# Patient Record
Sex: FEMALE | Race: WHITE | NOT HISPANIC OR LATINO
[De-identification: names, ages, dates, MRNs, and addresses within clinical notes are randomized per-mention and may not be internally consistent; named-entity substitution may affect disease eponyms.]

---

## 2020-02-13 ENCOUNTER — TRANSCRIPTION ENCOUNTER (OUTPATIENT)
Age: 25
End: 2020-02-13

## 2020-05-01 ENCOUNTER — OUTPATIENT (OUTPATIENT)
Dept: OUTPATIENT SERVICES | Facility: HOSPITAL | Age: 25
LOS: 1 days | End: 2020-05-01
Payer: MEDICAID

## 2020-05-01 PROCEDURE — G9001: CPT

## 2020-05-08 DIAGNOSIS — Z71.89 OTHER SPECIFIED COUNSELING: ICD-10-CM

## 2020-09-29 ENCOUNTER — RESULT REVIEW (OUTPATIENT)
Age: 25
End: 2020-09-29

## 2020-09-29 ENCOUNTER — OUTPATIENT (OUTPATIENT)
Dept: OUTPATIENT SERVICES | Facility: HOSPITAL | Age: 25
LOS: 1 days | End: 2020-09-29
Payer: MEDICAID

## 2020-09-29 ENCOUNTER — TRANSCRIPTION ENCOUNTER (OUTPATIENT)
Age: 25
End: 2020-09-29

## 2020-09-29 ENCOUNTER — APPOINTMENT (OUTPATIENT)
Dept: ULTRASOUND IMAGING | Facility: CLINIC | Age: 25
End: 2020-09-29
Payer: MEDICAID

## 2020-09-29 DIAGNOSIS — Z00.8 ENCOUNTER FOR OTHER GENERAL EXAMINATION: ICD-10-CM

## 2020-09-29 PROBLEM — Z00.00 ENCOUNTER FOR PREVENTIVE HEALTH EXAMINATION: Status: ACTIVE | Noted: 2020-09-29

## 2020-09-29 PROCEDURE — 93971 EXTREMITY STUDY: CPT | Mod: 26,LT

## 2020-09-29 PROCEDURE — 93971 EXTREMITY STUDY: CPT

## 2020-10-05 ENCOUNTER — APPOINTMENT (OUTPATIENT)
Dept: VASCULAR SURGERY | Facility: CLINIC | Age: 25
End: 2020-10-05
Payer: MEDICAID

## 2020-10-05 VITALS
HEIGHT: 62 IN | TEMPERATURE: 98.1 F | HEART RATE: 79 BPM | OXYGEN SATURATION: 99 % | DIASTOLIC BLOOD PRESSURE: 68 MMHG | BODY MASS INDEX: 20.43 KG/M2 | WEIGHT: 111 LBS | SYSTOLIC BLOOD PRESSURE: 106 MMHG

## 2020-10-05 PROCEDURE — 93970 EXTREMITY STUDY: CPT

## 2020-10-05 PROCEDURE — 99203 OFFICE O/P NEW LOW 30 MIN: CPT

## 2020-10-05 NOTE — PHYSICAL EXAM
[Normal Thyroid] : the thyroid was normal [Normal Breath Sounds] : Normal breath sounds [Normal Heart Sounds] : normal heart sounds [2+] : left 2+ [Ankle Swelling (On Exam)] : present [Varicose Veins Of Lower Extremities] : present [Varicose Veins Of The Left Leg] : of the left leg [Ankle Swelling On The Left] : moderate [No Rash or Lesion] : No rash or lesion [Alert] : alert [Oriented to Person] : oriented to person [Oriented to Place] : oriented to place [Oriented to Time] : oriented to time [Calm] : calm [JVD] : no jugular venous distention  [Abdomen Masses] : No abdominal masses [Abdomen Tenderness] : ~T ~M No abdominal tenderness [de-identified] : Well appearing [de-identified] : NCAT, EOMI [FreeTextEntry1] : Scattered spider veins bilaterally

## 2020-10-05 NOTE — ASSESSMENT
[FreeTextEntry1] : 25 year old female with left lower extremity symptomatic varicose veins\par Venous duplex reveals pathologic reflux in left GSV > 5 seconds.\par She has failed conservative management strategies including compression stockings and exercise.\par I have discussed the option of GSV RF ablation. We have discussed the risks and benefits of the planned procedure and she will like to proceed.\par Will schedule for left GSV ablation

## 2020-10-05 NOTE — HISTORY OF PRESENT ILLNESS
[FreeTextEntry1] : 25 year old female presents for evaluation of left leg pain and swelling. Symptoms have worsened over the few weeks She complains of burning pain on the medial side of her left thigh and calf with standing or sitting, heaviness in her legs and itchiness of the veins around her ankle. She also has ankle swelling that worsens through the day. Symptoms are worse in her left leg.\par Pain was so severe that she went to an Urgent Care facility last week. A venous duplex performed showed no DVT.\par She regularly wears compression stockings without improvement in symptoms.\par Patient also reports a painful varicosity in her vulvar area\par No history of DVT.\par She denies claudication, rest pain or leg ulcers.\par \par

## 2020-11-09 ENCOUNTER — APPOINTMENT (OUTPATIENT)
Dept: VASCULAR SURGERY | Facility: CLINIC | Age: 25
End: 2020-11-09

## 2020-11-12 ENCOUNTER — APPOINTMENT (OUTPATIENT)
Dept: VASCULAR SURGERY | Facility: CLINIC | Age: 25
End: 2020-11-12
Payer: MEDICAID

## 2020-11-12 VITALS
BODY MASS INDEX: 20.8 KG/M2 | OXYGEN SATURATION: 97 % | RESPIRATION RATE: 16 BRPM | DIASTOLIC BLOOD PRESSURE: 75 MMHG | SYSTOLIC BLOOD PRESSURE: 113 MMHG | HEIGHT: 62 IN | TEMPERATURE: 97.7 F | HEART RATE: 104 BPM | WEIGHT: 113 LBS

## 2020-11-12 PROCEDURE — 99213 OFFICE O/P EST LOW 20 MIN: CPT

## 2020-11-12 PROCEDURE — 93971 EXTREMITY STUDY: CPT

## 2020-11-12 PROCEDURE — 99072 ADDL SUPL MATRL&STAF TM PHE: CPT

## 2020-11-12 NOTE — ASSESSMENT
[FreeTextEntry1] : 25 year old female with left lower extremity symptomatic varicose veins\par Venous duplex reveals pathologic reflux in left GSV > 5 seconds.\par She is scheduled for left GSV ablation. \par Now returning with right thigh thrombophlebitis. Venous duplex today shows no DVT or great saphenous vein reflux. She has multiple right thigh tributaries\par -Recommend warm compresses and OTC NSAIDs for relief\par -She will return for her scheduled left GSV ablation

## 2020-11-12 NOTE — HISTORY OF PRESENT ILLNESS
[FreeTextEntry1] : 25 year old female presents for evaluation of left leg pain and swelling. Symptoms have worsened over the few weeks She complains of burning pain on the medial side of her left thigh and calf with standing or sitting, heaviness in her legs and itchiness of the veins around her ankle. She also has ankle swelling that worsens through the day. Symptoms are worse in her left leg.\par Pain was so severe that she went to an Urgent Care facility last week. A venous duplex performed showed no DVT.\par She regularly wears compression stockings without improvement in symptoms.\par Patient also reports a painful varicosity in her vulvar area\par No history of DVT.\par She denies claudication, rest pain or leg ulcers.\par \par  [de-identified] : Patient last seen 5 weeks ago\par She is planned for a left great saphenous vein ablation.\par Now returns with acute onset right thigh pain, tenderness over a varicosity and bruising.\par No recent trauma or strenuous activity.\par She denies right leg swelling

## 2020-11-12 NOTE — PHYSICAL EXAM
[Normal Thyroid] : the thyroid was normal [Normal Breath Sounds] : Normal breath sounds [Normal Heart Sounds] : normal heart sounds [2+] : left 2+ [Ankle Swelling (On Exam)] : present [Varicose Veins Of Lower Extremities] : present [Varicose Veins Of The Left Leg] : of the left leg [Ankle Swelling On The Left] : moderate [No Rash or Lesion] : No rash or lesion [Alert] : alert [Oriented to Person] : oriented to person [Oriented to Place] : oriented to place [Oriented to Time] : oriented to time [Calm] : calm [JVD] : no jugular venous distention  [Abdomen Masses] : No abdominal masses [Abdomen Tenderness] : ~T ~M No abdominal tenderness [de-identified] : Well appearing [de-identified] : NCAT, EOMI [FreeTextEntry1] : Scattered spider veins bilaterally\par Large right anterior thigh bruise, no hematoma

## 2022-09-13 ENCOUNTER — NON-APPOINTMENT (OUTPATIENT)
Age: 27
End: 2022-09-13

## 2022-09-14 ENCOUNTER — NON-APPOINTMENT (OUTPATIENT)
Age: 27
End: 2022-09-14

## 2022-09-23 ENCOUNTER — APPOINTMENT (OUTPATIENT)
Dept: VASCULAR SURGERY | Facility: CLINIC | Age: 27
End: 2022-09-23

## 2022-09-23 VITALS
HEIGHT: 62 IN | OXYGEN SATURATION: 98 % | HEART RATE: 84 BPM | SYSTOLIC BLOOD PRESSURE: 116 MMHG | DIASTOLIC BLOOD PRESSURE: 76 MMHG | WEIGHT: 110 LBS | BODY MASS INDEX: 20.24 KG/M2

## 2022-09-23 PROCEDURE — 93970 EXTREMITY STUDY: CPT

## 2022-09-23 PROCEDURE — 99213 OFFICE O/P EST LOW 20 MIN: CPT

## 2022-09-23 NOTE — ASSESSMENT
[FreeTextEntry1] : 27 year old female with left lower extremity symptomatic varicose veins. Pt has swelling and pain in her left medial ankle. hyperpigmentation at medial ankle. Venous duplex reveals pathologic reflux in left GSV > 2 seconds.\par \par - Pt counseled on results of duplex and above diagnosis.\par -Recommend to continue to use compression stockings. Walk daily for exercise \par -Plan for left GSV RFA. The risks and benefits of the surgical procedure were discussed with patient, all questions were answered.\par \par A total of 20 minutes was spent with patient and coordinating care\par

## 2022-09-23 NOTE — HISTORY OF PRESENT ILLNESS
[FreeTextEntry1] : 10/5/20 25 year old female presents for evaluation of left leg pain and swelling. Symptoms have worsened over the few weeks She complains of burning pain on the medial side of her left thigh and calf with standing or sitting, heaviness in her legs and itchiness of the veins around her ankle. She also has ankle swelling that worsens through the day. Symptoms are worse in her left leg.\par Pain was so severe that she went to an Urgent Care facility last week. A venous duplex performed showed no DVT.\par She regularly wears compression stockings without improvement in symptoms.\par Patient also reports a painful varicosity in her vulvar area\par No history of DVT.\par She denies claudication, rest pain or leg ulcers.\par \par 11/12/20: Patient last seen 5 weeks ago\par She is planned for a left great saphenous vein ablation.\par Now returns with acute onset right thigh pain, tenderness over a varicosity and bruising.\par No recent trauma or strenuous activity.\par She denies right leg swelling\par \par 9/23/22: 26 yo female with left medial calf swelling and pain. Pt states last week she had severe pain in her left medial calf which is slightly improving. She also has noticed the ankle is becoming darker. She has been compliant with compression stockings. \par \par

## 2022-09-23 NOTE — PHYSICAL EXAM
[Normal Thyroid] : the thyroid was normal [Normal Breath Sounds] : Normal breath sounds [Normal Heart Sounds] : normal heart sounds [2+] : left 2+ [Ankle Swelling (On Exam)] : present [Varicose Veins Of Lower Extremities] : present [Varicose Veins Of The Left Leg] : of the left leg [No Rash or Lesion] : No rash or lesion [Alert] : alert [Oriented to Person] : oriented to person [Oriented to Place] : oriented to place [Oriented to Time] : oriented to time [Calm] : calm [] : of the left leg [Ankle Swelling On The Left] : moderate [JVD] : no jugular venous distention  [Abdomen Masses] : No abdominal masses [Abdomen Tenderness] : ~T ~M No abdominal tenderness [de-identified] : Well appearing. NAD [FreeTextEntry1] : Scattered spider veins bilaterally\par Large right anterior thigh bruise, no hematoma

## 2022-09-23 NOTE — PROCEDURE
[FreeTextEntry1] : Studies: \par \par 9/23/22 BLE venous duplex: \par right: GSV enlarged with > 1 sec reflux. No DVT \par left: GSV enlarged with > 2 sec reflux. SSV with > 2 sec reflux. No DVT \par

## 2022-10-19 ENCOUNTER — NON-APPOINTMENT (OUTPATIENT)
Age: 27
End: 2022-10-19

## 2022-10-25 ENCOUNTER — APPOINTMENT (OUTPATIENT)
Dept: VASCULAR SURGERY | Facility: CLINIC | Age: 27
End: 2022-10-25

## 2022-11-04 RX ORDER — SODIUM BICARBONATE 84 MG/ML
8.4 INJECTION, SOLUTION INTRAVENOUS
Qty: 5 | Refills: 0 | Status: ACTIVE | COMMUNITY
Start: 2022-11-04 | End: 1900-01-01

## 2022-11-04 RX ORDER — LIDOCAINE HYDROCHLORIDE 10 MG/ML
1 INJECTION, SOLUTION INFILTRATION; PERINEURAL
Qty: 1 | Refills: 0 | Status: ACTIVE | COMMUNITY
Start: 2022-11-04 | End: 1900-01-01

## 2022-11-08 ENCOUNTER — APPOINTMENT (OUTPATIENT)
Dept: VASCULAR SURGERY | Facility: CLINIC | Age: 27
End: 2022-11-08

## 2022-11-08 VITALS
WEIGHT: 113 LBS | SYSTOLIC BLOOD PRESSURE: 111 MMHG | TEMPERATURE: 97 F | RESPIRATION RATE: 16 BRPM | HEIGHT: 62 IN | HEART RATE: 74 BPM | OXYGEN SATURATION: 99 % | DIASTOLIC BLOOD PRESSURE: 66 MMHG | BODY MASS INDEX: 20.8 KG/M2

## 2022-11-08 PROCEDURE — 36475 ENDOVENOUS RF 1ST VEIN: CPT | Mod: LT

## 2022-11-08 NOTE — PROCEDURE
[FreeTextEntry1] : left GSV RFA [FreeTextEntry2] : GSV insufficiency with swelling and pain  [FreeTextEntry3] : See scanned procedure note

## 2022-11-10 ENCOUNTER — APPOINTMENT (OUTPATIENT)
Dept: VASCULAR SURGERY | Facility: CLINIC | Age: 27
End: 2022-11-10

## 2022-11-10 PROCEDURE — 93971 EXTREMITY STUDY: CPT

## 2022-11-16 RX ORDER — SODIUM BICARBONATE 84 MG/ML
8.4 INJECTION, SOLUTION INTRAVENOUS
Qty: 5 | Refills: 0 | Status: ACTIVE | COMMUNITY
Start: 2022-10-21 | End: 1900-01-01

## 2022-11-16 RX ORDER — LIDOCAINE HYDROCHLORIDE 10 MG/ML
1 INJECTION, SOLUTION INFILTRATION; PERINEURAL
Qty: 500 | Refills: 0 | Status: ACTIVE | COMMUNITY
Start: 2022-10-21 | End: 1900-01-01

## 2022-11-22 ENCOUNTER — APPOINTMENT (OUTPATIENT)
Dept: VASCULAR SURGERY | Facility: CLINIC | Age: 27
End: 2022-11-22

## 2022-11-22 VITALS
WEIGHT: 112 LBS | TEMPERATURE: 97.8 F | BODY MASS INDEX: 20.61 KG/M2 | OXYGEN SATURATION: 96 % | RESPIRATION RATE: 16 BRPM | HEART RATE: 80 BPM | SYSTOLIC BLOOD PRESSURE: 112 MMHG | HEIGHT: 62 IN | DIASTOLIC BLOOD PRESSURE: 75 MMHG

## 2022-11-22 PROCEDURE — 36475 ENDOVENOUS RF 1ST VEIN: CPT | Mod: RT

## 2022-11-22 NOTE — PROCEDURE
[FreeTextEntry1] : right GSV RFA [FreeTextEntry2] : GSV insufficiency  [FreeTextEntry3] : See scanned procedure note

## 2022-11-29 ENCOUNTER — APPOINTMENT (OUTPATIENT)
Dept: VASCULAR SURGERY | Facility: CLINIC | Age: 27
End: 2022-11-29

## 2022-11-29 PROCEDURE — 93971 EXTREMITY STUDY: CPT

## 2022-12-06 ENCOUNTER — APPOINTMENT (OUTPATIENT)
Dept: VASCULAR SURGERY | Facility: CLINIC | Age: 27
End: 2022-12-06

## 2022-12-06 VITALS
RESPIRATION RATE: 16 BRPM | HEIGHT: 62 IN | DIASTOLIC BLOOD PRESSURE: 72 MMHG | BODY MASS INDEX: 21.53 KG/M2 | HEART RATE: 93 BPM | TEMPERATURE: 97.4 F | OXYGEN SATURATION: 98 % | SYSTOLIC BLOOD PRESSURE: 113 MMHG | WEIGHT: 117 LBS

## 2022-12-06 DIAGNOSIS — I87.2 VENOUS INSUFFICIENCY (CHRONIC) (PERIPHERAL): ICD-10-CM

## 2022-12-06 PROCEDURE — 99213 OFFICE O/P EST LOW 20 MIN: CPT

## 2022-12-06 NOTE — HISTORY OF PRESENT ILLNESS
[FreeTextEntry1] : 10/5/20 25 year old female presents for evaluation of left leg pain and swelling. Symptoms have worsened over the few weeks She complains of burning pain on the medial side of her left thigh and calf with standing or sitting, heaviness in her legs and itchiness of the veins around her ankle. She also has ankle swelling that worsens through the day. Symptoms are worse in her left leg.\par Pain was so severe that she went to an Urgent Care facility last week. A venous duplex performed showed no DVT.\par She regularly wears compression stockings without improvement in symptoms.\par Patient also reports a painful varicosity in her vulvar area\par No history of DVT.\par She denies claudication, rest pain or leg ulcers.\par \par 11/12/20: Patient last seen 5 weeks ago\par She is planned for a left great saphenous vein ablation.\par Now returns with acute onset right thigh pain, tenderness over a varicosity and bruising.\par No recent trauma or strenuous activity.\par She denies right leg swelling\par \par 9/23/22: 28 yo female with left medial calf swelling and pain. Pt states last week she had severe pain in her left medial calf which is slightly improving. She also has noticed the ankle is becoming darker. She has been compliant with compression stockings. \par \par 12/6/22: Pt doing well s/p bilateral GSV RFA. She has a pulling pain in her right medial thigh area. She still has swelling of her left ankle with many itchy and painful reticular veins. She has been compliant with compression stockings. \par \par PSHx: \par 11/22/22 right GSV RFA\par 11/8/22 left GSV RFA

## 2022-12-06 NOTE — ASSESSMENT
[FreeTextEntry1] : 27 year old female with left lower extremity symptomatic varicose veins. Pt has swelling and pain in her left medial ankle. hyperpigmentation at medial ankle. Venous duplex reveals pathologic reflux in left GSV > 2 seconds.\par \par - Pt counseled on results of duplex and above diagnosis.\par -Recommend to continue to use compression stockings. Walk daily for exercise \par -Plan for BLE sclerotherapy. The risks and benefits of the surgical procedure were discussed with patient, all questions were answered.\par \par A total of 20 minutes was spent with patient and coordinating care\par

## 2022-12-06 NOTE — PHYSICAL EXAM
[Normal Thyroid] : the thyroid was normal [Normal Breath Sounds] : Normal breath sounds [Normal Heart Sounds] : normal heart sounds [2+] : left 2+ [Ankle Swelling (On Exam)] : present [Varicose Veins Of Lower Extremities] : present [Varicose Veins Of The Left Leg] : of the left leg [] : of the left leg [Ankle Swelling On The Left] : moderate [No Rash or Lesion] : No rash or lesion [Alert] : alert [Oriented to Person] : oriented to person [Oriented to Place] : oriented to place [Oriented to Time] : oriented to time [Calm] : calm [JVD] : no jugular venous distention  [Abdomen Masses] : No abdominal masses [Abdomen Tenderness] : ~T ~M No abdominal tenderness [de-identified] : Well appearing. NAD [FreeTextEntry1] : Scattered spider veins bilaterally\par Left medial ankle with many reticular veins

## 2023-01-13 ENCOUNTER — NON-APPOINTMENT (OUTPATIENT)
Age: 28
End: 2023-01-13

## 2023-01-23 ENCOUNTER — APPOINTMENT (OUTPATIENT)
Dept: VASCULAR SURGERY | Facility: CLINIC | Age: 28
End: 2023-01-23
Payer: MEDICAID

## 2023-01-23 VITALS
SYSTOLIC BLOOD PRESSURE: 113 MMHG | TEMPERATURE: 98.4 F | BODY MASS INDEX: 20.8 KG/M2 | HEIGHT: 62 IN | RESPIRATION RATE: 16 BRPM | DIASTOLIC BLOOD PRESSURE: 71 MMHG | OXYGEN SATURATION: 98 % | WEIGHT: 113 LBS | HEART RATE: 94 BPM

## 2023-01-23 PROCEDURE — 36471 NJX SCLRSNT MLT INCMPTNT VN: CPT | Mod: 50

## 2023-01-23 PROCEDURE — 93970 EXTREMITY STUDY: CPT

## 2023-01-23 NOTE — PROCEDURE
[FreeTextEntry1] : BLE sclerotherapy  [FreeTextEntry2] : spider and reticular veins  [FreeTextEntry3] : Explained sclerotherapy procedure to patient and obtained verbal consent. All questions answered prior to procedure. 70% alcohol used to prep legs prior to injection. 1% polidocanol injected into BLE reticular veins through a 30 gauge needle. Over 20 injections into legs bilaterally. Sterile gauze and ACE wraps applied to legs bilaterally. Pt tolerated procedure well.\par \par \par

## 2023-01-30 ENCOUNTER — APPOINTMENT (OUTPATIENT)
Dept: VASCULAR SURGERY | Facility: CLINIC | Age: 28
End: 2023-01-30
Payer: MEDICAID

## 2023-01-30 VITALS
SYSTOLIC BLOOD PRESSURE: 111 MMHG | RESPIRATION RATE: 16 BRPM | OXYGEN SATURATION: 95 % | TEMPERATURE: 97.1 F | DIASTOLIC BLOOD PRESSURE: 77 MMHG | BODY MASS INDEX: 20.8 KG/M2 | HEART RATE: 92 BPM | WEIGHT: 113 LBS | HEIGHT: 62 IN

## 2023-01-30 PROCEDURE — 36471 NJX SCLRSNT MLT INCMPTNT VN: CPT | Mod: 50

## 2023-01-30 NOTE — PROCEDURE
[FreeTextEntry1] : BLE sclerotherapy  [FreeTextEntry2] : spider veins  [FreeTextEntry3] : Explained sclerotherapy procedure to patient and obtained verbal consent. All questions answered prior to procedure. 70% alcohol used to prep legs prior to injection. 1% polidocanol injected into BLE reticular veins through a 30 gauge needle. Over 20 injections into legs bilaterally. Sterile gauze and ACE wraps applied to legs bilaterally. Pt tolerated procedure well.\par \par \par

## 2023-02-27 ENCOUNTER — APPOINTMENT (OUTPATIENT)
Dept: VASCULAR SURGERY | Facility: CLINIC | Age: 28
End: 2023-02-27
Payer: MEDICAID

## 2023-02-27 VITALS
SYSTOLIC BLOOD PRESSURE: 114 MMHG | HEIGHT: 62 IN | DIASTOLIC BLOOD PRESSURE: 70 MMHG | HEART RATE: 79 BPM | RESPIRATION RATE: 14 BRPM | WEIGHT: 111 LBS | BODY MASS INDEX: 20.43 KG/M2 | TEMPERATURE: 97.7 F | OXYGEN SATURATION: 97 %

## 2023-02-27 PROCEDURE — 99213 OFFICE O/P EST LOW 20 MIN: CPT

## 2023-02-27 NOTE — PROCEDURE
[FreeTextEntry1] : Studies: \par \par 9/23/22 BLE venous duplex: \par right: GSV enlarged with > 1 sec reflux. No DVT \par left: GSV enlarged with > 2 sec reflux. SSV with > 2 sec reflux. No DVT \par \par 11/10/22 LLE venous duplex: GSV closed. no DVT \par \par 11/29/22 RLE venous duplex: GSV closed. no DVT \par

## 2023-02-27 NOTE — HISTORY OF PRESENT ILLNESS
[FreeTextEntry1] : 10/5/20 25 year old female presents for evaluation of left leg pain and swelling. Symptoms have worsened over the few weeks She complains of burning pain on the medial side of her left thigh and calf with standing or sitting, heaviness in her legs and itchiness of the veins around her ankle. She also has ankle swelling that worsens through the day. Symptoms are worse in her left leg.\par Pain was so severe that she went to an Urgent Care facility last week. A venous duplex performed showed no DVT.\par She regularly wears compression stockings without improvement in symptoms.\par Patient also reports a painful varicosity in her vulvar area\par No history of DVT.\par She denies claudication, rest pain or leg ulcers.\par \par 11/12/20: Patient last seen 5 weeks ago\par She is planned for a left great saphenous vein ablation.\par Now returns with acute onset right thigh pain, tenderness over a varicosity and bruising.\par No recent trauma or strenuous activity.\par She denies right leg swelling\par \par 9/23/22: 26 yo female with left medial calf swelling and pain. Pt states last week she had severe pain in her left medial calf which is slightly improving. She also has noticed the ankle is becoming darker. She has been compliant with compression stockings. \par \par 12/6/22: Pt doing well s/p bilateral GSV RFA. She has a pulling pain in her right medial thigh area. She still has swelling of her left ankle with many itchy and painful reticular veins. She has been compliant with compression stockings. \par \par 2/27/23: Pt doing well s/p sclerotherapy. She still has some painful varicose veins in her left ankle. She is overall doing well. \par \par PSHx: \par 1/30/23 BLE sclerotherapy\par 1/23/23 BLE sclerotherapy\par 11/22/22 right GSV RFA\par 11/8/22 left GSV RFA

## 2023-02-27 NOTE — PHYSICAL EXAM
[JVD] : no jugular venous distention  [Normal Thyroid] : the thyroid was normal [Normal Breath Sounds] : Normal breath sounds [Normal Heart Sounds] : normal heart sounds [2+] : left 2+ [Ankle Swelling (On Exam)] : present [Varicose Veins Of Lower Extremities] : present [Varicose Veins Of The Left Leg] : of the left leg [] : of the left leg [Ankle Swelling On The Left] : moderate [Abdomen Masses] : No abdominal masses [Abdomen Tenderness] : ~T ~M No abdominal tenderness [No Rash or Lesion] : No rash or lesion [Alert] : alert [Oriented to Person] : oriented to person [Oriented to Place] : oriented to place [Oriented to Time] : oriented to time [Calm] : calm [de-identified] : Well appearing. NAD [FreeTextEntry1] : Scattered spider veins bilaterally\par Left medial ankle with many reticular veins \par mild hyperpigmentation over injected reticular veins

## 2023-02-27 NOTE — ASSESSMENT
[FreeTextEntry1] : 28 year old female with left lower extremity symptomatic varicose veins. Pt has swelling and pain in her left medial ankle. hyperpigmentation at medial ankle. Pt is s/p BL GSV RFA and two sessions of BLE sclerotherapy. Pt still has some patent reticular and spider veins. \par \par - Pt counseled on results of duplex and above diagnosis.\par -Recommend to continue to use compression stockings. Walk daily for exercise \par -Plan for BLE sclerotherapy in 6 weeks. The risks and benefits of the surgical procedure were discussed with patient, all questions were answered.\par \par A total of 20 minutes was spent with patient and coordinating care\par

## 2023-04-10 ENCOUNTER — APPOINTMENT (OUTPATIENT)
Dept: VASCULAR SURGERY | Facility: CLINIC | Age: 28
End: 2023-04-10
Payer: MEDICAID

## 2023-04-10 VITALS
HEIGHT: 62 IN | HEART RATE: 63 BPM | TEMPERATURE: 97.9 F | OXYGEN SATURATION: 97 % | RESPIRATION RATE: 16 BRPM | BODY MASS INDEX: 21.16 KG/M2 | DIASTOLIC BLOOD PRESSURE: 71 MMHG | WEIGHT: 115 LBS | SYSTOLIC BLOOD PRESSURE: 109 MMHG

## 2023-04-10 DIAGNOSIS — I78.1 NEVUS, NON-NEOPLASTIC: ICD-10-CM

## 2023-04-10 PROCEDURE — 36471 NJX SCLRSNT MLT INCMPTNT VN: CPT | Mod: 50

## 2023-04-10 NOTE — PROCEDURE
[FreeTextEntry1] : BLE sclerotherapy  [FreeTextEntry2] : painful and itchy reticular and spider veins  [FreeTextEntry3] : Explained sclerotherapy procedure to patient and obtained verbal consent. All questions answered prior to procedure. 70% alcohol used to prep legs prior to injection. 0.5% polidocanol injected into BLE reticular veins through a 30 gauge needle. Over 20 injections into legs bilaterally. Sterile gauze and ACE wraps applied to legs bilaterally. Pt tolerated procedure well.\par \par \par

## 2023-08-10 ENCOUNTER — NON-APPOINTMENT (OUTPATIENT)
Age: 28
End: 2023-08-10

## 2023-11-10 ENCOUNTER — NON-APPOINTMENT (OUTPATIENT)
Age: 28
End: 2023-11-10

## 2024-01-12 ENCOUNTER — NON-APPOINTMENT (OUTPATIENT)
Age: 29
End: 2024-01-12

## 2024-01-26 ENCOUNTER — NON-APPOINTMENT (OUTPATIENT)
Age: 29
End: 2024-01-26

## 2024-02-14 ENCOUNTER — NON-APPOINTMENT (OUTPATIENT)
Age: 29
End: 2024-02-14

## 2024-02-27 ENCOUNTER — NON-APPOINTMENT (OUTPATIENT)
Age: 29
End: 2024-02-27

## 2024-04-17 ENCOUNTER — NON-APPOINTMENT (OUTPATIENT)
Age: 29
End: 2024-04-17

## 2024-12-27 ENCOUNTER — NON-APPOINTMENT (OUTPATIENT)
Age: 29
End: 2024-12-27

## 2025-04-10 ENCOUNTER — NON-APPOINTMENT (OUTPATIENT)
Age: 30
End: 2025-04-10

## 2025-04-18 ENCOUNTER — NON-APPOINTMENT (OUTPATIENT)
Age: 30
End: 2025-04-18